# Patient Record
Sex: FEMALE | Race: OTHER
[De-identification: names, ages, dates, MRNs, and addresses within clinical notes are randomized per-mention and may not be internally consistent; named-entity substitution may affect disease eponyms.]

---

## 2018-10-26 ENCOUNTER — HOSPITAL ENCOUNTER (INPATIENT)
Dept: HOSPITAL 36 - ER | Age: 83
LOS: 3 days | Discharge: NURSING, REHAB OR LONG-TERM CARE | DRG: 291 | End: 2018-10-29
Attending: FAMILY MEDICINE | Admitting: FAMILY MEDICINE
Payer: MEDICARE

## 2018-10-26 VITALS — SYSTOLIC BLOOD PRESSURE: 122 MMHG | DIASTOLIC BLOOD PRESSURE: 69 MMHG

## 2018-10-26 DIAGNOSIS — Z16.12: ICD-10-CM

## 2018-10-26 DIAGNOSIS — N39.0: ICD-10-CM

## 2018-10-26 DIAGNOSIS — Z86.73: ICD-10-CM

## 2018-10-26 DIAGNOSIS — Z88.0: ICD-10-CM

## 2018-10-26 DIAGNOSIS — I11.0: Primary | ICD-10-CM

## 2018-10-26 DIAGNOSIS — B96.20: ICD-10-CM

## 2018-10-26 DIAGNOSIS — I48.91: ICD-10-CM

## 2018-10-26 DIAGNOSIS — I50.33: ICD-10-CM

## 2018-10-26 DIAGNOSIS — J18.9: ICD-10-CM

## 2018-10-26 DIAGNOSIS — Z83.3: ICD-10-CM

## 2018-10-26 LAB
ALBUMIN SERPL-MCNC: 3.7 GM/DL (ref 3.7–5.3)
ALBUMIN/GLOB SERPL: 1.2 {RATIO} (ref 1–1.8)
ALP SERPL-CCNC: 90 U/L (ref 34–104)
ALT SERPL-CCNC: 26 U/L (ref 7–52)
AMPHET UR-MCNC: NEGATIVE NG/ML
ANION GAP SERPL CALC-SCNC: 11.7 MMOL/L (ref 7–16)
APPEARANCE UR: (no result)
AST SERPL-CCNC: 24 U/L (ref 13–39)
BACTERIA #/AREA URNS HPF: (no result) /HPF
BARBITURATES UR-MCNC: NEGATIVE UG/ML
BASOPHILS # BLD AUTO: 0.1 TH/CUMM (ref 0–0.2)
BASOPHILS NFR BLD AUTO: 1.7 % (ref 0–2)
BENZODIAZEPINES PNL UR: NEGATIVE
BILIRUB SERPL-MCNC: 0.4 MG/DL (ref 0.3–1)
BILIRUB UR-MCNC: NEGATIVE MG/DL
BUN SERPL-MCNC: 26 MG/DL (ref 7–25)
CALCIUM SERPL-MCNC: 9 MG/DL (ref 8.6–10.3)
CANNABINOIDS SERPL QL CFM: NEGATIVE
CHLORIDE SERPL-SCNC: 100 MEQ/L (ref 98–107)
CO2 SERPL-SCNC: 27.5 MEQ/L (ref 21–31)
COCAINE METAB.OTHER UR-MCNC: NEGATIVE NG/ML
COLOR UR: YELLOW
CREAT SERPL-MCNC: 0.8 MG/DL (ref 0.6–1.2)
EOSINOPHIL # BLD AUTO: 0.1 TH/CMM (ref 0.1–0.4)
EOSINOPHIL NFR BLD AUTO: 2 % (ref 0–5)
EPI CELLS URNS QL MICRO: (no result) /LPF
ERYTHROCYTE [DISTWIDTH] IN BLOOD BY AUTOMATED COUNT: 17 % (ref 11.5–20)
GLOBULIN SER-MCNC: 3.2 GM/DL
GLUCOSE SERPL-MCNC: 103 MG/DL (ref 70–105)
GLUCOSE UR STRIP-MCNC: NEGATIVE MG/DL
HCT VFR BLD CALC: 38.1 % (ref 41–60)
HGB BLD-MCNC: 12.7 GM/DL (ref 12–16)
INR PPP: 1.76 (ref 0.5–1.4)
KETONES UR STRIP-MCNC: NEGATIVE MG/DL
LEUKOCYTE ESTERASE UR-ACNC: NEGATIVE
LYMPHOCYTE AB SER FC-ACNC: 1.6 TH/CMM (ref 1.5–3)
LYMPHOCYTES NFR BLD AUTO: 25.1 % (ref 20–50)
MAGNESIUM SERPL-MCNC: 2.2 MG/DL (ref 1.9–2.7)
MCH RBC QN AUTO: 29.7 PG (ref 27–31)
MCHC RBC AUTO-ENTMCNC: 33.3 PG (ref 28–36)
MCV RBC AUTO: 89.2 FL (ref 81–100)
METHADONE UR CFM-MCNC: NEGATIVE NG/ML
METHAMPHET UR QL: NEGATIVE
MICRO URNS: YES
MONOCYTES # BLD AUTO: 0.5 TH/CMM (ref 0.3–1)
MONOCYTES NFR BLD AUTO: 7.8 % (ref 2–10)
NEUTROPHILS # BLD: 4.2 TH/CMM (ref 1.8–8)
NEUTROPHILS NFR BLD AUTO: 63.4 % (ref 40–80)
NITRITE UR QL STRIP: POSITIVE
OPIATES UR QL: NEGATIVE
PCP UR-MCNC: NEGATIVE UG/L
PH UR STRIP: 7 [PH] (ref 4.6–8)
PHOSPHATE SERPL-MCNC: 3.4 MG/DL (ref 2.5–5)
PLATELET # BLD: 253 TH/CMM (ref 150–400)
PMV BLD AUTO: 7.8 FL
POTASSIUM SERPL-SCNC: 4.2 MEQ/L (ref 3.5–5.1)
PROT UR STRIP-MCNC: NEGATIVE MG/DL
PROTHROMBIN TIME: 17.8 SECONDS (ref 9.5–11.5)
RBC # BLD AUTO: 4.27 MIL/CMM (ref 3.8–5.2)
RBC # UR STRIP: NEGATIVE /UL
RBC #/AREA URNS HPF: (no result) /HPF (ref 0–5)
SODIUM SERPL-SCNC: 135 MEQ/L (ref 136–145)
SP GR UR STRIP: 1.01 (ref 1–1.03)
TRICYCLICS UR QL: NEGATIVE
URINALYSIS COMPLETE PNL UR: (no result)
UROBILINOGEN UR STRIP-ACNC: 0.2 E.U./DL (ref 0.2–1)
WBC # BLD AUTO: 6.5 TH/CMM (ref 4.8–10.8)
WBC #/AREA URNS HPF: (no result) /HPF (ref 0–5)

## 2018-10-26 PROCEDURE — Z7610: HCPCS

## 2018-10-26 NOTE — ED PHYSICIAN CHART
ED Chief Complaint/HPI





- Patient Information


Date Seen:: 10/26/18


Time Seen:: 15:35


Chief Complaint:: poor appetite


History of Present Illness:: 





poor appetite in a patient who has had a previous stroke.





according to the daughter, she occasionally coughs with liquids.





she has had pneumonia in the past.





she is on coumadin at this time.


Allergies:: 


 Allergies











Allergy/AdvReac Type Severity Reaction Status Date / Time


 


Penicillins [PCN] Allergy   Verified 10/26/18 15:35











Vitals:: 


 Vital Signs - 8 hr











  10/26/18





  15:35


 


Temp 98.9 F


 


HR 66


 


RR 16


 


/65


 


O2 Sat % 97











Historian:: Family Member


Review:: Nurse's Note Reviewed





ED Review of Systems





- Review of Systems


General/Constitutional: No fever, No chills, No weight loss, No weakness, No 

diaphoresis, No edema, No loss of appetite, Other


Skin: Skin lesions, No rash, No bruising


Head: No headache, No light-headedness


Eyes: No loss of vision, No pain, No diplopia


ENT: No earache, No nasal drainage, No sore throat, No tinnitus


Neck: No neck pain, No swelling, No thyromegaly, No stiffness, No mass noted


Cardio Vascular: No chest pain, No palpitations, No PND, No orthopnea, No edema


Pulmonary: No SOB, No cough, No sputum, No wheezing


GI: No nausea, No vomiting, No diarrhea, No pain, No melena, No hematochezia, 

No constipation, No hematemesis


G/U: No dysuria, No frequency, No hematuria


Musculoskeletal: No bone or joint pain, No back pain, No muscle pain


Endocrine: No polyuria, No polydipsia


Psychiatric: No prior psych history, No depression, No anxiety, No suicidal 

ideation


Hematopoietic: No bruising, No lymphadenopathy


Allergic/Immuno: No urticaria, No angioedema


Neurological: No syncope, No focal symptoms, No weakness, No paresthesia, No 

headache, No seizure, No dizziness, No confusion, No vertigo





ED Past Medical History





- Past Medical History


Obtainable: No


Past Medical History: HTN, CVA/TIA, Other (atrial fibrillation; pneumonia)





Family Medical History





- Family Member


  ** Daughter


Living Status: Still Living


Hx Family Diabetes: Yes





ED Physical Exam





- Physical Examination


General/Constitutional: Awake, Well-developed, well-nourished, Alert, No 

distress, GCS 15, Non-toxic appearing, Ambulatory


Head: Atraumatic


Eyes: Lids, conjuctiva normal, PERRL, EOMI


Skin: Nl inspection, No rash, No skin lesions, No ecchymosis, Well hydrated, No 

lymphadenopathy


ENMT: External ears, nose nl


Neck: Nontender, No nuchal rigidity


Respiratory: Nl effort/Exclusion


Other Respiratory comments:: 


decreased breath sounds at bases.


Cardio Vascular: RRR, No murmur, gallop, rubs, NL S1 S2


GI: No tenderness/rebounding/guarding, No organomegaly, No hernia, Normal BS's, 

Nondistended


: No CVA tenderness


Other Extremities comments:: 





RUE and RLE with decreased strength and with edema.


Neuro/Psych: Mood normal


Other Neuro/Psych comments:: 





RUE and RLE with decreased strength and with edema.


Other Misc comments:: 





sacral stage I





ED Labs/Radiology/EKG Results





- Lab Results


Results: 


 Laboratory Tests











  10/26/18 10/26/18 10/26/18





  16:30 16:30 16:30


 


WBC  6.5  


 


RBC  4.27  


 


Hgb  12.7  


 


Hct  38.1 L  


 


MCV  89.2  


 


MCH  29.7  


 


MCHC Differential  33.3  


 


RDW  17.0  


 


Plt Count  253  


 


MPV  7.8  


 


Neutrophils %  63.4  


 


Lymphocytes %  25.1  


 


Monocytes %  7.8  


 


Eosinophils %  2.0  


 


Basophils %  1.7  


 


PT   


 


INR   


 


PTT (Actin FS)   


 


Sodium   135 L 


 


Potassium   4.2 


 


Chloride   100 


 


Carbon Dioxide   27.5 


 


Anion Gap   11.7 


 


BUN   26 H 


 


Creatinine   0.8 


 


Est GFR ( Amer)   TNP 


 


Est GFR (Non-Af Amer)   TNP 


 


BUN/Creatinine Ratio   32.5 


 


Glucose   103 


 


Whole Bld Lactic Acid   


 


Calcium   9.0 


 


Phosphorus   3.4 


 


Magnesium   2.2 


 


Total Bilirubin   0.4 


 


AST   24 


 


ALT   26 


 


Alkaline Phosphatase   90 


 


Troponin I    0.01


 


Total Protein   6.9 


 


Albumin   3.7 


 


Globulin   3.2 


 


Albumin/Globulin Ratio   1.2 


 


Urine Source   


 


Urine Color   


 


Urine Clarity   


 


Urine pH   


 


Ur Specific Gravity   


 


Urine Protein   


 


Urine Glucose (UA)   


 


Urine Ketones   


 


Urine Blood   


 


Urine Nitrate   


 


Urine Bilirubin   


 


Urine Urobilinogen   


 


Ur Leukocyte Esterase   


 


Urine RBC   


 


Urine WBC   


 


Ur Epithelial Cells   


 


Urine Bacteria   


 


Urine Opiates Screen   


 


Urine Methadone Screen   


 


Ur Barbiturates Screen   


 


Ur Tricyclics Screen   


 


Ur Phencyclidine Scrn   


 


Amphetamines Screen   


 


U Methamphetamines Scrn   


 


U Benzodiazepines Scrn   


 


U Cocaine Metab Screen   


 


U Cannabinoids Screen   














  10/26/18 10/26/18 10/26/18





  16:30 16:30 16:54


 


WBC   


 


RBC   


 


Hgb   


 


Hct   


 


MCV   


 


MCH   


 


MCHC Differential   


 


RDW   


 


Plt Count   


 


MPV   


 


Neutrophils %   


 


Lymphocytes %   


 


Monocytes %   


 


Eosinophils %   


 


Basophils %   


 


PT   17.8 H 


 


INR   1.76 H 


 


PTT (Actin FS)   32.1 


 


Sodium   


 


Potassium   


 


Chloride   


 


Carbon Dioxide   


 


Anion Gap   


 


BUN   


 


Creatinine   


 


Est GFR ( Amer)   


 


Est GFR (Non-Af Amer)   


 


BUN/Creatinine Ratio   


 


Glucose   


 


Whole Bld Lactic Acid  1.14  


 


Calcium   


 


Phosphorus   


 


Magnesium   


 


Total Bilirubin   


 


AST   


 


ALT   


 


Alkaline Phosphatase   


 


Troponin I   


 


Total Protein   


 


Albumin   


 


Globulin   


 


Albumin/Globulin Ratio   


 


Urine Source    CATH


 


Urine Color    YELLOW


 


Urine Clarity    HAZY


 


Urine pH    7.0


 


Ur Specific Gravity    1.010


 


Urine Protein    NEGATIVE


 


Urine Glucose (UA)    NEGATIVE


 


Urine Ketones    NEGATIVE


 


Urine Blood    NEGATIVE


 


Urine Nitrate    POSITIVE H


 


Urine Bilirubin    NEGATIVE


 


Urine Urobilinogen    0.2


 


Ur Leukocyte Esterase    NEGATIVE


 


Urine RBC    0-2


 


Urine WBC    6-10 H


 


Ur Epithelial Cells    FEW


 


Urine Bacteria    4+ H


 


Urine Opiates Screen   


 


Urine Methadone Screen   


 


Ur Barbiturates Screen   


 


Ur Tricyclics Screen   


 


Ur Phencyclidine Scrn   


 


Amphetamines Screen   


 


U Methamphetamines Scrn   


 


U Benzodiazepines Scrn   


 


U Cocaine Metab Screen   


 


U Cannabinoids Screen   














  10/26/18





  16:54


 


WBC 


 


RBC 


 


Hgb 


 


Hct 


 


MCV 


 


MCH 


 


MCHC Differential 


 


RDW 


 


Plt Count 


 


MPV 


 


Neutrophils % 


 


Lymphocytes % 


 


Monocytes % 


 


Eosinophils % 


 


Basophils % 


 


PT 


 


INR 


 


PTT (Actin FS) 


 


Sodium 


 


Potassium 


 


Chloride 


 


Carbon Dioxide 


 


Anion Gap 


 


BUN 


 


Creatinine 


 


Est GFR ( Amer) 


 


Est GFR (Non-Af Amer) 


 


BUN/Creatinine Ratio 


 


Glucose 


 


Whole Bld Lactic Acid 


 


Calcium 


 


Phosphorus 


 


Magnesium 


 


Total Bilirubin 


 


AST 


 


ALT 


 


Alkaline Phosphatase 


 


Troponin I 


 


Total Protein 


 


Albumin 


 


Globulin 


 


Albumin/Globulin Ratio 


 


Urine Source 


 


Urine Color 


 


Urine Clarity 


 


Urine pH 


 


Ur Specific Gravity 


 


Urine Protein 


 


Urine Glucose (UA) 


 


Urine Ketones 


 


Urine Blood 


 


Urine Nitrate 


 


Urine Bilirubin 


 


Urine Urobilinogen 


 


Ur Leukocyte Esterase 


 


Urine RBC 


 


Urine WBC 


 


Ur Epithelial Cells 


 


Urine Bacteria 


 


Urine Opiates Screen  NEGATIVE


 


Urine Methadone Screen  NEGATIVE


 


Ur Barbiturates Screen  NEGATIVE


 


Ur Tricyclics Screen  NEGATIVE


 


Ur Phencyclidine Scrn  NEGATIVE


 


Amphetamines Screen  NEGATIVE


 


U Methamphetamines Scrn  NEGATIVE


 


U Benzodiazepines Scrn  NEGATIVE


 


U Cocaine Metab Screen  NEGATIVE


 


U Cannabinoids Screen  NEGATIVE














ED Assessment





- Assessment


General Assessment: 





EKG from 16:05:47 p.m.:  atrial fibrillation,  Q wave III and AVF with flipped 

t wave in V1.  





CXR reveals some bronchograms and a possible early RLL infiltrate.





ED Septic Shock





- .


Is Septic Shock (SBP<90, OR Lactate>4 mmol\L) present?: No





- <6hrs of presentation:


Vital Signs: 


 Vital Signs - 8 hr











  10/26/18





  15:35


 


Temp 98.9 F


 


HR 66


 


RR 16


 


/65


 


O2 Sat % 97














ED Reassessment (Disposition)





- Reassessment


Reassessment Condition:: Unchanged





- Diagnosis


Diagnosis:: 


Right lower lobe infiltrate





Urinary tract infection





Hemiplegia s/p prior CVA





Elevated prothrombin time





- Patient Disposition


Discharge/Transfer:: Acute Care w/in this hosp


Accepting Physician:: Dr. Vergara


Time Called:: 17:48


Time Responded:: 17:48


Admitted to:: Telemetry


Condition at Disposition:: Stable, Unchanged

## 2018-10-27 LAB
ALBUMIN SERPL-MCNC: 3.2 GM/DL (ref 3.7–5.3)
ALBUMIN/GLOB SERPL: 1.1 {RATIO} (ref 1–1.8)
ALP SERPL-CCNC: 79 U/L (ref 34–104)
ALT SERPL-CCNC: 21 U/L (ref 7–52)
ANION GAP SERPL CALC-SCNC: 11.5 MMOL/L (ref 7–16)
AST SERPL-CCNC: 20 U/L (ref 13–39)
BASOPHILS # BLD AUTO: 0 TH/CUMM (ref 0–0.2)
BASOPHILS NFR BLD AUTO: 0.2 % (ref 0–2)
BILIRUB SERPL-MCNC: 0.6 MG/DL (ref 0.3–1)
BUN SERPL-MCNC: 18 MG/DL (ref 7–25)
CALCIUM SERPL-MCNC: 8.6 MG/DL (ref 8.6–10.3)
CHLORIDE SERPL-SCNC: 107 MEQ/L (ref 98–107)
CK SERPL-CCNC: 31 U/L (ref 30–223)
CO2 SERPL-SCNC: 25.3 MEQ/L (ref 21–31)
CREAT SERPL-MCNC: 0.7 MG/DL (ref 0.6–1.2)
EOSINOPHIL # BLD AUTO: 0.2 TH/CMM (ref 0.1–0.4)
EOSINOPHIL NFR BLD AUTO: 2.3 % (ref 0–5)
ERYTHROCYTE [DISTWIDTH] IN BLOOD BY AUTOMATED COUNT: 17.2 % (ref 11.5–20)
GLOBULIN SER-MCNC: 2.8 GM/DL
GLUCOSE SERPL-MCNC: 95 MG/DL (ref 70–105)
HCT VFR BLD CALC: 35.8 % (ref 41–60)
HGB BLD-MCNC: 11.9 GM/DL (ref 12–16)
LYMPHOCYTE AB SER FC-ACNC: 1.3 TH/CMM (ref 1.5–3)
LYMPHOCYTES NFR BLD AUTO: 19.2 % (ref 20–50)
MCH RBC QN AUTO: 29.7 PG (ref 27–31)
MCHC RBC AUTO-ENTMCNC: 33.2 PG (ref 28–36)
MCV RBC AUTO: 89.5 FL (ref 81–100)
MONOCYTES # BLD AUTO: 0.5 TH/CMM (ref 0.3–1)
MONOCYTES NFR BLD AUTO: 6.9 % (ref 2–10)
NEUTROPHILS # BLD: 4.8 TH/CMM (ref 1.8–8)
NEUTROPHILS NFR BLD AUTO: 71.4 % (ref 40–80)
PLATELET # BLD: 200 TH/CMM (ref 150–400)
PMV BLD AUTO: 8 FL
POTASSIUM SERPL-SCNC: 3.8 MEQ/L (ref 3.5–5.1)
RBC # BLD AUTO: 4 MIL/CMM (ref 3.8–5.2)
SODIUM SERPL-SCNC: 140 MEQ/L (ref 136–145)
WBC # BLD AUTO: 6.8 TH/CMM (ref 4.8–10.8)

## 2018-10-27 RX ADMIN — POTASSIUM CHLORIDE SCH MEQ: 20 TABLET, EXTENDED RELEASE ORAL at 13:21

## 2018-10-27 RX ADMIN — DILTIAZEM HYDROCHLORIDE SCH MG: 30 TABLET, FILM COATED ORAL at 17:55

## 2018-10-27 NOTE — DIAGNOSTIC IMAGING REPORT
Exam: CT examination of the brain.



HISTORY: CVA.



Total DLP equals 685



CTDI equals 37.4



Findings:



Multiple contiguous thin section of the brain were obtained from the

base of skull to the vertex without the administration of contrast

material, no prior studies available for comparison



The study demonstrates large area hypodensity suggestive of extensive

encephalomalacia of the left parietal lobe.  There is evidence for

chronic ischemic white matter changes bilaterally.

There is no evidence for hemorrhage midline shift or edema.



The bony calvarium is intact.  The paranasal sinuses are well aerated.



IMPRESSION: Extensive encephalomalacia status post wide infarct in

distribution left middle cerebral artery.  Correlation prior exams

recommended.



Atrophy

## 2018-10-27 NOTE — DIAGNOSTIC IMAGING REPORT
Portable chest x-ray

Time: 1642



History: Pneumonia



Allowing for portable technique the heart size is normal. No focal

pulmonary parenchymal processes. No hilar or mediastinal abnormalities. 

Mild congestion is present



Impression: No acute abnormalities. 

Mild congestion.

## 2018-10-27 NOTE — HISTORY & PHYSICAL
ADMIT DATE:  10/27/2018



CHIEF COMPLAINT:  Poor appetite and weakness.



HISTORY OF PRESENT ILLNESS:  This is an 84-year-old female who was admitted to

[facility] with a chief complaint of weakness

and poor appetite.



REVIEW OF SYSTEMS:

GENERAL:  This is an 84-year-old female who appears as stated.

HEAD:  Denies headache, denies dizziness.

EYES:  Denies eye pain.  Denies blurring of vision.

NECK:  Denies neck pain.  Denies nuchal rigidity.

CHEST:  Denies chest pain.  Denies palpitation.

PULMONARY:  Denies shortness of breath. Positive cough.

GASTROINTESTINAL:  Denies abdominal pain.  Denies constipation.  Denies

diarrhea.

MUSCULOSKELETAL:  Denies joint pain.  Denies muscle pain.



SOCIAL HISTORY:  The patient lives in a skilled nursing facility prior to

hospitalization.



PAST SURGICAL HISTORY:  Unremarkable.



FAMILY HISTORY:  Unremarkable.



PAST MEDICAL HISTORY:  Includes hypertension, atrial fibrillation.



PHYSICAL EXAMINATION:

VITAL SIGNS:  Temperature 97.3, heart rate of 86, blood pressure 124/75,

respiration 18, 96% on room air.

HEENT:  Head is atraumatic, normocephalic.  Eyes:  Bilateral conjunctivae are

clear.  Bilateral pupils are equally round and reactive.

NECK:  Supple.  No JVD.

CARDIOVASCULAR:  S1 and S2, without murmur.

PULMONARY:  Clear to auscultation.

GASTROINTESTINAL:  Soft and nontender without guarding.  Positive bowel sounds.

MUSCULOSKELETAL:  No clubbing.  No cyanosis noted.



ASSESSMENT:

1.  Urinary tract infection.

2.  History of cerebrovascular accident.

3.  Hypertension.

4.  Atrial fibrillation.



PLAN: We will admit the patient and will initiate IV antibiotics until we get

the culture results. We are also going to follow up with the ID doctor and

cardiologist and do medication reconciliation accordingly.  Treatment plans were

discussed with the patient's nurse.  Treatment plans were discussed with Dr. Vergara.





DD: 10/27/2018 12:48

DT: 10/27/2018 15:54

JOB# [id number]  [id number]

## 2018-10-28 LAB
ALBUMIN SERPL-MCNC: 3.1 GM/DL (ref 3.7–5.3)
ALBUMIN/GLOB SERPL: 1.1 {RATIO} (ref 1–1.8)
ALP SERPL-CCNC: 76 U/L (ref 34–104)
ALT SERPL-CCNC: 19 U/L (ref 7–52)
ANION GAP SERPL CALC-SCNC: 10.9 MMOL/L (ref 7–16)
AST SERPL-CCNC: 19 U/L (ref 13–39)
BASOPHILS # BLD AUTO: 0 TH/CUMM (ref 0–0.2)
BASOPHILS NFR BLD AUTO: 0.1 % (ref 0–2)
BILIRUB SERPL-MCNC: 0.7 MG/DL (ref 0.3–1)
BUN SERPL-MCNC: 15 MG/DL (ref 7–25)
CALCIUM SERPL-MCNC: 8.5 MG/DL (ref 8.6–10.3)
CHLORIDE SERPL-SCNC: 106 MEQ/L (ref 98–107)
CO2 SERPL-SCNC: 23.8 MEQ/L (ref 21–31)
CREAT SERPL-MCNC: 0.7 MG/DL (ref 0.6–1.2)
EOSINOPHIL # BLD AUTO: 0.2 TH/CMM (ref 0.1–0.4)
EOSINOPHIL NFR BLD AUTO: 2.5 % (ref 0–5)
ERYTHROCYTE [DISTWIDTH] IN BLOOD BY AUTOMATED COUNT: 16.9 % (ref 11.5–20)
GLOBULIN SER-MCNC: 2.8 GM/DL
GLUCOSE SERPL-MCNC: 93 MG/DL (ref 70–105)
HCT VFR BLD CALC: 35.3 % (ref 41–60)
HGB BLD-MCNC: 12 GM/DL (ref 12–16)
INR PPP: 1.76 (ref 0.5–1.4)
LYMPHOCYTE AB SER FC-ACNC: 1.6 TH/CMM (ref 1.5–3)
LYMPHOCYTES NFR BLD AUTO: 26.4 % (ref 20–50)
MCH RBC QN AUTO: 30.3 PG (ref 27–31)
MCHC RBC AUTO-ENTMCNC: 33.8 PG (ref 28–36)
MCV RBC AUTO: 89.7 FL (ref 81–100)
MONOCYTES # BLD AUTO: 0.5 TH/CMM (ref 0.3–1)
MONOCYTES NFR BLD AUTO: 8.2 % (ref 2–10)
NEUTROPHILS # BLD: 3.9 TH/CMM (ref 1.8–8)
NEUTROPHILS NFR BLD AUTO: 62.8 % (ref 40–80)
PLATELET # BLD: 219 TH/CMM (ref 150–400)
PMV BLD AUTO: 7.7 FL
POTASSIUM SERPL-SCNC: 3.7 MEQ/L (ref 3.5–5.1)
PROTHROMBIN TIME: 17.8 SECONDS (ref 9.5–11.5)
RBC # BLD AUTO: 3.94 MIL/CMM (ref 3.8–5.2)
SODIUM SERPL-SCNC: 137 MEQ/L (ref 136–145)
WBC # BLD AUTO: 6.2 TH/CMM (ref 4.8–10.8)

## 2018-10-28 RX ADMIN — DILTIAZEM HYDROCHLORIDE SCH: 30 TABLET, FILM COATED ORAL at 06:02

## 2018-10-28 RX ADMIN — POTASSIUM CHLORIDE SCH MEQ: 750 TABLET, EXTENDED RELEASE ORAL at 08:32

## 2018-10-28 RX ADMIN — DILTIAZEM HYDROCHLORIDE SCH: 30 TABLET, FILM COATED ORAL at 17:17

## 2018-10-28 RX ADMIN — DILTIAZEM HYDROCHLORIDE SCH: 30 TABLET, FILM COATED ORAL at 00:01

## 2018-10-28 RX ADMIN — DILTIAZEM HYDROCHLORIDE SCH: 30 TABLET, FILM COATED ORAL at 12:05

## 2018-10-28 RX ADMIN — POTASSIUM CHLORIDE SCH MEQ: 20 TABLET, EXTENDED RELEASE ORAL at 08:32

## 2018-10-28 NOTE — INTERNAL MEDICINE PROG NOTE
Internal Medicine Subjective





- Subjective


Patient seen and examined:: chart reviewed


Patient is:: awake, verbal


Patient Complaints of:: other (weakness)


Per staff patient has:: poor appetite





Internal Medicine Objective





- Results


Result Diagrams: 


 10/28/18 05:15





 10/28/18 05:15


Recent Labs: 


 Laboratory Last Values











WBC  6.2 Th/cmm (4.8-10.8)   10/28/18  05:15    


 


RBC  3.94 Mil/cmm (3.80-5.20)   10/28/18  05:15    


 


Hgb  12.0 gm/dL (12-16)   10/28/18  05:15    


 


Hct  35.3 % (41.0-60)  L  10/28/18  05:15    


 


MCV  89.7 fl ()   10/28/18  05:15    


 


MCH  30.3 pg (27.0-31.0)   10/28/18  05:15    


 


MCHC Differential  33.8 pg (28.0-36.0)   10/28/18  05:15    


 


RDW  16.9 % (11.5-20.0)   10/28/18  05:15    


 


Plt Count  219 Th/cmm (150-400)   10/28/18  05:15    


 


MPV  7.7 fl  10/28/18  05:15    


 


Neutrophils %  62.8 % (40.0-80.0)   10/28/18  05:15    


 


Lymphocytes %  26.4 % (20.0-50.0)   10/28/18  05:15    


 


Monocytes %  8.2 % (2.0-10.0)   10/28/18  05:15    


 


Eosinophils %  2.5 % (0.0-5.0)   10/28/18  05:15    


 


Basophils %  0.1 % (0.0-2.0)   10/28/18  05:15    


 


PT  17.8 SECONDS (9.5-11.5)  H  10/28/18  05:15    


 


INR  1.76  (0.5-1.4)  H  10/28/18  05:15    


 


PTT (Actin FS)  32.1 SECONDS (26.0-38.0)   10/26/18  16:30    


 


Sodium  137 mEq/L (136-145)   10/28/18  05:15    


 


Potassium  3.7 mEq/L (3.5-5.1)   10/28/18  05:15    


 


Chloride  106 mEq/L ()   10/28/18  05:15    


 


Carbon Dioxide  23.8 mEq/L (21.0-31.0)   10/28/18  05:15    


 


Anion Gap  10.9  (7.0-16.0)   10/28/18  05:15    


 


BUN  15 mg/dL (7-25)   10/28/18  05:15    


 


Creatinine  0.7 mg/dL (0.6-1.2)   10/28/18  05:15    


 


Est GFR ( Amer)  TNP   10/28/18  05:15    


 


Est GFR (Non-Af Amer)  TNP   10/28/18  05:15    


 


BUN/Creatinine Ratio  21.4   10/28/18  05:15    


 


Glucose  93 mg/dL ()   10/28/18  05:15    


 


Whole Bld Lactic Acid  1.14 mmol/L (0.60-1.99)   10/26/18  16:30    


 


Calcium  8.5 mg/dL (8.6-10.3)  L  10/28/18  05:15    


 


Phosphorus  3.4 mg/dL (2.5-5.0)   10/26/18  16:30    


 


Magnesium  2.2 mg/dL (1.9-2.7)   10/26/18  16:30    


 


Total Bilirubin  0.7 mg/dL (0.3-1.0)   10/28/18  05:15    


 


AST  19 U/L (13-39)   10/28/18  05:15    


 


ALT  19 U/L (7-52)   10/28/18  05:15    


 


Alkaline Phosphatase  76 U/L ()   10/28/18  05:15    


 


Creatine Kinase  31 U/L ()   10/27/18  06:00    


 


Troponin I  0.01 ng/mL (0.01-0.05)   10/26/18  16:30    


 


B-Natriuretic Peptide  302.0 pg/mL (5.0-100.0)  H  10/28/18  05:15    


 


Total Protein  5.9 gm/dL (6.0-8.3)  L  10/28/18  05:15    


 


Albumin  3.1 gm/dL (3.7-5.3)  L  10/28/18  05:15    


 


Globulin  2.8 gm/dL  10/28/18  05:15    


 


Albumin/Globulin Ratio  1.1  (1.0-1.8)   10/28/18  05:15    


 


TSH  0.42 uIU/ml (0.34-5.60)   10/26/18  16:30    


 


Urine Source  CATH   10/26/18  16:54    


 


Urine Color  YELLOW   10/26/18  16:54    


 


Urine Clarity  HAZY  (CLEAR)   10/26/18  16:54    


 


Urine pH  7.0  (4.6 - 8.0)   10/26/18  16:54    


 


Ur Specific Gravity  1.010  (1.005-1.030)   10/26/18  16:54    


 


Urine Protein  NEGATIVE mg/dL (NEGATIVE)   10/26/18  16:54    


 


Urine Glucose (UA)  NEGATIVE mg/dL (NEGATIVE)   10/26/18  16:54    


 


Urine Ketones  NEGATIVE mg/dL (NEGATIVE)   10/26/18  16:54    


 


Urine Blood  NEGATIVE  (NEGATIVE)   10/26/18  16:54    


 


Urine Nitrate  POSITIVE  (NEGATIVE)  H  10/26/18  16:54    


 


Urine Bilirubin  NEGATIVE  (NEGATIVE)   10/26/18  16:54    


 


Urine Urobilinogen  0.2 E.U./dL (0.2 - 1.0)   10/26/18  16:54    


 


Ur Leukocyte Esterase  NEGATIVE  (NEGATIVE)   10/26/18  16:54    


 


Urine RBC  0-2 /hpf (0-5)   10/26/18  16:54    


 


Urine WBC  6-10 /hpf (0-5)  H  10/26/18  16:54    


 


Ur Epithelial Cells  FEW /lpf (FEW)   10/26/18  16:54    


 


Urine Bacteria  4+ /hpf (NONE SEEN)  H  10/26/18  16:54    


 


Urine Opiates Screen  NEGATIVE  (NEGATIVE)   10/26/18  16:54    


 


Urine Methadone Screen  NEGATIVE  (NEGATIVE)   10/26/18  16:54    


 


Ur Barbiturates Screen  NEGATIVE  (NEGATIVE)   10/26/18  16:54    


 


Ur Tricyclics Screen  NEGATIVE  (NEGATIVE)   10/26/18  16:54    


 


Ur Phencyclidine Scrn  NEGATIVE  (NEGATIVE)   10/26/18  16:54    


 


Amphetamines Screen  NEGATIVE  (NEGATIVE)   10/26/18  16:54    


 


U Methamphetamines Scrn  NEGATIVE  (NEGATIVE)   10/26/18  16:54    


 


U Benzodiazepines Scrn  NEGATIVE  (NEGATIVE)   10/26/18  16:54    


 


U Cocaine Metab Screen  NEGATIVE  (NEGATIVE)   10/26/18  16:54    


 


U Cannabinoids Screen  NEGATIVE  (NEGATIVE)   10/26/18  16:54    














- Physical Exam


Vitals and I&O: 


 Vital Signs











Temp  97.3 F   10/28/18 07:50


 


Pulse  91   10/28/18 07:50


 


Resp  18   10/28/18 07:50


 


BP  124/67   10/28/18 08:32


 


Pulse Ox  99   10/28/18 07:50








 Intake & Output











 10/27/18 10/28/18 10/28/18





 18:59 06:59 18:59


 


Intake Total  1000 


 


Balance  1000 


 


Weight (lbs) 60.781 kg 61.19 kg 


 


Intake:   


 


  Intake, IV Amount  1000 


 


    Sodium Chloride 0.45% 1,  1000 





    000 ml @ 50 mls/hr IV .   





    Q20H [facility] Rx#:[id number]   


 


Other:   


 


  # Voids 4 3 


 


  # Bowel Movements 0 0 


 


  Weight Source Bedscale Bedscale 











Active Medications: 


Current Medications





Diltiazem HCl (Cardizem)  60 mg PO Q6HR MARK


   Stop: 12/26/18 17:59


   Last Admin: 10/28/18 06:02 Dose:  Not Given


Furosemide (Lasix)  20 mg IVP DAILY MARK


   Stop: 12/27/18 08:59


   Last Admin: 10/28/18 08:32 Dose:  20 mg


Furosemide (Lasix)  20 mg PO DAILY MARK


   Stop: 12/26/18 12:59


   Last Admin: 10/28/18 08:32 Dose:  20 mg


Sodium Chloride (Nacl 0.45%)  1,000 mls @ 50 mls/hr IV .Q20H MARK


   Stop: 12/25/18 21:19


   Last Admin: 10/28/18 04:40 Dose:  50 mls/hr


Levofloxacin (Levaquin Pb)  500 mg in 100 mls @ 100 mls/hr IV Q24HR MARK


   Stop: 12/26/18 12:59


   Last Admin: 10/27/18 13:19 Dose:  100 mls/hr


Ipratropium Bromide (Atrovent Neb 0.5mg/2.5ml)  0.5 mg HHN Q4HR PRN


   PRN Reason: Shortness of Breath


   Stop: 12/26/18 12:47


Potassium Chloride (Klor-Con)  10 meq PO DAILY MARK


   Stop: 12/27/18 08:59


   Last Admin: 10/28/18 08:32 Dose:  10 meq


Potassium Chloride (Klor-Con)  20 meq PO DAILY MARK


   Stop: 12/26/18 12:59


   Last Admin: 10/28/18 08:32 Dose:  20 meq


Warfarin Sodium (Coumadin)  1.5 mg PO SuTuThSa@1300 MARK; Protocol


   Stop: 12/26/18 12:59


   Last Admin: 10/27/18 13:21 Dose:  1.5 mg


Warfarin Sodium (Coumadin)  2 mg PO MWF@1300 MARK; Protocol


   Stop: 12/28/18 12:59








General: alert


HEENT: NC/AT


Neck: Supple


Lungs: CTAB


Cardiovascular: Normal S1, Normal S2


Abdomen: soft, non-tender


Extremities: clear


Neurological: no change





Internal Medicine Assmt/Plan





- Assessment


Assessment: 





uti


h/o cva


htn


atrial fib 





- Plan


Plan: 





as per order sheet

## 2018-10-28 NOTE — CONSULTATION
DATE OF CONSULTATION:  10/27/2018



The patient of Dr. Vergara.



HISTORY AND PHYSICAL:  This 84-year-old female patient who was admitted to

[facility] complaining of weakness, tiredness and short of

breath.  The patient had atrial fibrillation with elevated BNP level and the

patient is admitted and cardiac consult requested.



PAST MEDICAL HISTORY:  The patient has a history of atrial fibrillation, CVA

with late effect and hypertension.



FAMILY HISTORY:  Unremarkable.



SOCIAL HISTORY:  No history of smoking, alcohol abuse.



ALLERGIES:  No known allergies.



PHYSICAL EXAMINATION:

VITAL SIGNS:  Blood pressure 105/48, pulse 91 and irregular and respirations 20.

HEAD:  Normocephalic.  No lumps or bumps.

EYES:  Pupils equal and reactive to light.  Fundi show AV nicking, sclerae

white, conjunctivae pink.

NECK:  Carotid 2+.  Normal upstroke.  JVD 10 cm above sternal angle.  Thyroid

not palpable.  Lymph nodes not palpable.

CHEST:  Shows increased AP diameter.  No kyphosis or scoliosis.

LUNGS:  Bilateral rales.  Decreased breath sounds both the bases.

HEART:  PMI, fifth intercostal space with lateral to midclavicular line.  S1

irregular.  S2, S3, S4, soft systolic murmur.

ABDOMEN:  Soft.  Liver and spleen not palpable.  No organomegaly.  Bowel sounds

active.

NEUROLOGIC:  No focal neurological deficit.

EXTREMITIES:  Peripheral pulses 2+.  No pedal edema.



CLINICAL IMPRESSION:  Congestive heart failure, diastolic dysfunction,

acute-on-chronic cerebrovascular accident with late effect, urinary tract

infection, hypertension and atrial fibrillation.



PLAN:  Continue present care and antibiotics, fluid restriction and IV Lasix.





DD: 10/28/2018 15:06

DT: 10/28/2018 17:42

JOB# [id number]  [id number]

## 2018-10-28 NOTE — CARDIOLOGY
10/27/2018



Patient of Dr. Vergara.



M-MODE ECHOCARDIOGRAM:  Mitral valve, anterior leaflet of mitral valve shows

normal excursion, EF velocity.  Posterior leaflet of the mitral valve shows

normal excursion.  Left ventricular posterior wall showed normal thickness,

excursion.  Interventricular septum showed normal thickness, excursion. 

Ejection fraction 65%.  Left atrium normal.  Aortic root shows normal dimension,

normal excursion of aortic leaflets.



CONCLUSION:  Normal M-Mode echo, ejection fraction 65%.



2D ECHO:  Long axis view showed normal sized left ventricle with normal wall

motion, mitral valve shows normal excursion.  Left atrium normal.  Aortic root

shows normal dimension, normal excursion of aortic leaflets.  Short axis view of

mitral valve normal.  Short axis view of aortic valve normal.  Apical

four-chamber view showed normal sized left ventricle, left atrium, right

ventricle and right atrium enlarged.



CONCLUSION:  Right atrial enlargement, ejection fraction 65%.



Doppler study shows moderate mitral regurgitation, moderate-to-severe tricuspid

regurgitation, right ventricular systolic pressure 48 mmHg with mild pulmonary

hypertension.





DD: 10/28/2018 15:39

DT: 10/28/2018 17:31

[facility]# [id number]  [id number]

## 2018-10-29 RX ADMIN — POTASSIUM CHLORIDE SCH MEQ: 20 TABLET, EXTENDED RELEASE ORAL at 08:20

## 2018-10-29 RX ADMIN — DILTIAZEM HYDROCHLORIDE SCH: 30 TABLET, FILM COATED ORAL at 00:15

## 2018-10-29 RX ADMIN — DILTIAZEM HYDROCHLORIDE SCH MG: 30 TABLET, FILM COATED ORAL at 14:03

## 2018-10-29 RX ADMIN — DILTIAZEM HYDROCHLORIDE SCH: 30 TABLET, FILM COATED ORAL at 05:13

## 2018-10-29 RX ADMIN — POTASSIUM CHLORIDE SCH MEQ: 750 TABLET, EXTENDED RELEASE ORAL at 08:20

## 2018-10-29 NOTE — INTERNAL MEDICINE PROG NOTE
Internal Medicine Subjective





- Subjective


Patient seen and examined:: chart reviewed, other (doing well )


Patient is:: awake, verbal


Patient Complaints of:: other (weakness)


Per staff patient has:: poor appetite





Internal Medicine Objective





- Results


Result Diagrams: 


 10/28/18 05:15





 10/28/18 05:15


Recent Labs: 


 Laboratory Last Values











WBC  6.2 Th/cmm (4.8-10.8)   10/28/18  05:15    


 


RBC  3.94 Mil/cmm (3.80-5.20)   10/28/18  05:15    


 


Hgb  12.0 gm/dL (12-16)   10/28/18  05:15    


 


Hct  35.3 % (41.0-60)  L  10/28/18  05:15    


 


MCV  89.7 fl ()   10/28/18  05:15    


 


MCH  30.3 pg (27.0-31.0)   10/28/18  05:15    


 


MCHC Differential  33.8 pg (28.0-36.0)   10/28/18  05:15    


 


RDW  16.9 % (11.5-20.0)   10/28/18  05:15    


 


Plt Count  219 Th/cmm (150-400)   10/28/18  05:15    


 


MPV  7.7 fl  10/28/18  05:15    


 


Neutrophils %  62.8 % (40.0-80.0)   10/28/18  05:15    


 


Lymphocytes %  26.4 % (20.0-50.0)   10/28/18  05:15    


 


Monocytes %  8.2 % (2.0-10.0)   10/28/18  05:15    


 


Eosinophils %  2.5 % (0.0-5.0)   10/28/18  05:15    


 


Basophils %  0.1 % (0.0-2.0)   10/28/18  05:15    


 


PT  17.8 SECONDS (9.5-11.5)  H  10/28/18  05:15    


 


INR  1.76  (0.5-1.4)  H  10/28/18  05:15    


 


PTT (Actin FS)  32.1 SECONDS (26.0-38.0)   10/26/18  16:30    


 


Sodium  137 mEq/L (136-145)   10/28/18  05:15    


 


Potassium  3.7 mEq/L (3.5-5.1)   10/28/18  05:15    


 


Chloride  106 mEq/L ()   10/28/18  05:15    


 


Carbon Dioxide  23.8 mEq/L (21.0-31.0)   10/28/18  05:15    


 


Anion Gap  10.9  (7.0-16.0)   10/28/18  05:15    


 


BUN  15 mg/dL (7-25)   10/28/18  05:15    


 


Creatinine  0.7 mg/dL (0.6-1.2)   10/28/18  05:15    


 


Est GFR ( Amer)  TNP   10/28/18  05:15    


 


Est GFR (Non-Af Amer)  TNP   10/28/18  05:15    


 


BUN/Creatinine Ratio  21.4   10/28/18  05:15    


 


Glucose  93 mg/dL ()   10/28/18  05:15    


 


Whole Bld Lactic Acid  1.14 mmol/L (0.60-1.99)   10/26/18  16:30    


 


Calcium  8.5 mg/dL (8.6-10.3)  L  10/28/18  05:15    


 


Phosphorus  3.4 mg/dL (2.5-5.0)   10/26/18  16:30    


 


Magnesium  2.2 mg/dL (1.9-2.7)   10/26/18  16:30    


 


Total Bilirubin  0.7 mg/dL (0.3-1.0)   10/28/18  05:15    


 


AST  19 U/L (13-39)   10/28/18  05:15    


 


ALT  19 U/L (7-52)   10/28/18  05:15    


 


Alkaline Phosphatase  76 U/L ()   10/28/18  05:15    


 


Creatine Kinase  31 U/L ()   10/27/18  06:00    


 


Troponin I  0.01 ng/mL (0.01-0.05)   10/26/18  16:30    


 


B-Natriuretic Peptide  302.0 pg/mL (5.0-100.0)  H  10/28/18  05:15    


 


Total Protein  5.9 gm/dL (6.0-8.3)  L  10/28/18  05:15    


 


Albumin  3.1 gm/dL (3.7-5.3)  L  10/28/18  05:15    


 


Globulin  2.8 gm/dL  10/28/18  05:15    


 


Albumin/Globulin Ratio  1.1  (1.0-1.8)   10/28/18  05:15    


 


TSH  0.42 uIU/ml (0.34-5.60)   10/26/18  16:30    


 


Urine Source  CATH   10/26/18  16:54    


 


Urine Color  YELLOW   10/26/18  16:54    


 


Urine Clarity  HAZY  (CLEAR)   10/26/18  16:54    


 


Urine pH  7.0  (4.6 - 8.0)   10/26/18  16:54    


 


Ur Specific Gravity  1.010  (1.005-1.030)   10/26/18  16:54    


 


Urine Protein  NEGATIVE mg/dL (NEGATIVE)   10/26/18  16:54    


 


Urine Glucose (UA)  NEGATIVE mg/dL (NEGATIVE)   10/26/18  16:54    


 


Urine Ketones  NEGATIVE mg/dL (NEGATIVE)   10/26/18  16:54    


 


Urine Blood  NEGATIVE  (NEGATIVE)   10/26/18  16:54    


 


Urine Nitrate  POSITIVE  (NEGATIVE)  H  10/26/18  16:54    


 


Urine Bilirubin  NEGATIVE  (NEGATIVE)   10/26/18  16:54    


 


Urine Urobilinogen  0.2 E.U./dL (0.2 - 1.0)   10/26/18  16:54    


 


Ur Leukocyte Esterase  NEGATIVE  (NEGATIVE)   10/26/18  16:54    


 


Urine RBC  0-2 /hpf (0-5)   10/26/18  16:54    


 


Urine WBC  6-10 /hpf (0-5)  H  10/26/18  16:54    


 


Ur Epithelial Cells  FEW /lpf (FEW)   10/26/18  16:54    


 


Urine Bacteria  4+ /hpf (NONE SEEN)  H  10/26/18  16:54    


 


Urine Opiates Screen  NEGATIVE  (NEGATIVE)   10/26/18  16:54    


 


Urine Methadone Screen  NEGATIVE  (NEGATIVE)   10/26/18  16:54    


 


Ur Barbiturates Screen  NEGATIVE  (NEGATIVE)   10/26/18  16:54    


 


Ur Tricyclics Screen  NEGATIVE  (NEGATIVE)   10/26/18  16:54    


 


Ur Phencyclidine Scrn  NEGATIVE  (NEGATIVE)   10/26/18  16:54    


 


Amphetamines Screen  NEGATIVE  (NEGATIVE)   10/26/18  16:54    


 


U Methamphetamines Scrn  NEGATIVE  (NEGATIVE)   10/26/18  16:54    


 


U Benzodiazepines Scrn  NEGATIVE  (NEGATIVE)   10/26/18  16:54    


 


U Cocaine Metab Screen  NEGATIVE  (NEGATIVE)   10/26/18  16:54    


 


U Cannabinoids Screen  NEGATIVE  (NEGATIVE)   10/26/18  16:54    














- Physical Exam


Vitals and I&O: 


 Vital Signs











Temp  97.4 F   10/29/18 13:30


 


Pulse  77   10/29/18 14:03


 


Resp  17   10/29/18 13:30


 


BP  120/71   10/29/18 13:30


 


Pulse Ox  100   10/29/18 13:30








 Intake & Output











 10/28/18 10/29/18 10/29/18





 18:59 06:59 18:59


 


Intake Total 1200 994.167 


 


Balance 1200 994.167 


 


Weight (lbs) 61.19 kg 61.689 kg 60.963 kg


 


Intake:   


 


  Intake, IV Amount 100 994.167 


 


    Meropenem 500 mg In 100  





    Sodium Chloride 0.9% 100   





    ml @ 100 mls/hr IV Q8H   





    [facility] Rx#:[id number]   


 


    Sodium Chloride 0.45% 1,  994.167 





    000 ml @ 50 mls/hr IV .   





    Q20H [facility] Rx#:[id number]   


 


  Oral 1100  


 


Other:   


 


  # Voids 3  2


 


  # Bowel Movements 0  


 


  Weight Source Bedscale Bedscale Bedscale











Active Medications: 


Current Medications





Diltiazem HCl (Cardizem)  60 mg PO Q6HR MARK


   Stop: 12/26/18 17:59


   Last Admin: 10/29/18 14:03 Dose:  60 mg


Furosemide (Lasix)  20 mg IVP DAILY MARK


   Stop: 12/27/18 08:59


   Last Admin: 10/29/18 08:20 Dose:  20 mg


Sodium Chloride (Nacl 0.45%)  1,000 mls @ 50 mls/hr IV .Q20H MARK


   Stop: 12/25/18 21:19


   Last Admin: 10/29/18 00:33 Dose:  50 mls/hr


Meropenem 500 mg/ Sodium (Chloride)  100 mls @ 100 mls/hr IV Q12HR MARK


   Stop: 12/28/18 08:59


   Last Admin: 10/29/18 08:22 Dose:  100 mls/hr


Ipratropium Bromide (Atrovent Neb 0.5mg/2.5ml)  0.5 mg HHN Q4HR PRN


   PRN Reason: Shortness of Breath


   Stop: 12/26/18 12:47


Potassium Chloride (Klor-Con)  10 meq PO DAILY MARK


   Stop: 12/27/18 08:59


   Last Admin: 10/29/18 08:20 Dose:  10 meq


Potassium Chloride (Klor-Con)  20 meq PO DAILY MARK


   Stop: 12/26/18 12:59


   Last Admin: 10/29/18 08:20 Dose:  20 meq


Warfarin Sodium (Coumadin)  1.5 mg PO SuTuThSa@1300 MARK; Protocol


   Stop: 12/26/18 12:59


   Last Admin: 10/28/18 12:34 Dose:  1.5 mg


Warfarin Sodium (Coumadin)  2 mg PO MWF@1300 MARK; Protocol


   Stop: 12/28/18 12:59


   Last Admin: 10/29/18 14:02 Dose:  2 mg








General: alert


HEENT: NC/AT


Neck: Supple


Lungs: CTAB


Cardiovascular: Normal S1, Normal S2


Abdomen: soft, non-tender


Extremities: clear


Neurological: no change





Internal Medicine Assmt/Plan





- Assessment


Assessment: 





uti


h/o cva


htn


atrial fib 





- Plan


Plan: 





as per order sheet

## 2018-10-29 NOTE — CONSULTATION
DATE OF CONSULTATION:     10/28/2018



INFECTIOUS DISEASE CONSULTATION



REFERRING PHYSICIAN:  Dr. Vergara.



REASON FOR CONSULTATION:  UTI.



HISTORY OF PRESENT ILLNESS:  The patient is a 54-year-old female with a past

medical history of hypertension and atrial fibrillation, admitted to [facility] ER for generalized weakness and poor appetite.  On initial

evaluation, the patient's temperature was 98.9 degrees Fahrenheit and WBC count

was 6500.  Urinalysis showed hazy urine with wbc's 6-10 and 4+ bacteria.  The

patient was diagnosed to have UTI and urine culture grew ESBL Escherichia coli. 

ID consult was called for further antibiotic management.  The patient was

receiving Levaquin, which was changed to meropenem.



PAST MEDICAL HISTORY:  Hypertension, atrial fibrillation.



SOCIAL HISTORY:  The patient lives at nursing facility.  No history of smoking,

alcohol or drug use.



PAST SURGICAL HISTORY:  Noncontributory.



FAMILY HISTORY:  Unremarkable.



REVIEW OF SYSTEMS:

GENERAL:  The patient has no fever, no chills.

HEENT:  No diplopia, no photophobia, no sore throat.

RESPIRATORY:  No cough, no shortness of breath.

CARDIOVASCULAR:  No chest pain or palpitation.

GASTROINTESTINAL:  No nausea, no vomiting, no diarrhea, no constipation.

GENITOURINARY:  No dysuria.  No headache.

GENITOURINARY:  No dysuria, no hematuria.

CENTRAL NERVOUS SYSTEM:  No headache, no dizziness, no focal weakness.

SKIN:  The patient has no rash, no ulcer.



PHYSICAL EXAMINATION:

VITAL SIGNS:  Shows temperature is 98 degrees Fahrenheit, pulse 95, respiration

is 19, blood pressure 99/64.

GENERAL:  The patient is comfortable lying in the bed, not in acute distress.

HEENT:  Head is normocephalic, atraumatic.  Oral cavity moist, pink tongue. 

Eyes:  PERRLA, no pallor or icterus.  PERRLA, EOMI.

NECK:  Supple, no JVD, no carotid bruit.  Trachea in midline.

CHEST:  Bilateral breath sounds.  No crackles or wheezing.

HEART:  S1, S2 within normal limits.  Regular rhythm.  No murmur, no gallop.

ABDOMEN:  Soft, nontender, nondistended.  Bowel sounds present.

EXTREMITIES:  No cyanosis, no clubbing, no edema.

NEUROLOGIC:  Alert, awake, oriented x 3.



LABORATORY DATA:  Current lab shows WBC count is 6200, hemoglobin 12, hematocrit

35.3, platelets are 290,000, neutrophil is 63%.  Sodium is 137, potassium 3.7,

chloride 106, bicarbonate is 24, BUN is 15, creatinine 0.7, glucose is 93. 

Urinalysis shows positive nitrite, wbc's 6-10 and bacteria 4+.  Urine culture

grew ESBL Escherichia coli.  Blood culture 1 set is negative and MRSA screen is

negative.  Chest x-ray showed no acute abnormality, mild congestion.  CT scan of

the head showed an extensive encephalomalacia, status post wide infarct in the

distribution of the left middle cerebral artery.  Atrophy.



IMPRESSION:

1.  Urinary tract infection.  Urine culture grew ESBL Escherichia coli.

2.  History of cerebrovascular accident.

3.  Hypertension.

4.  Atrial fibrillation.



RECOMMENDATIONS:

Continue meropenem for a total of 7 days.







DD: 10/29/2018 00:21

DT: 10/29/2018 04:23

JOB# [id number]  [id number]

GABBY

## 2018-10-29 NOTE — INFECTIOUS DISEASE PROG NOTE
Infectious Disease Subjective





- Review of Systems


Service Date: 10/29/18


Subjective: 





Doing well. no fever.





Infectious Disease Objective





- Results


Result Diagrams: 


 10/28/18 05:15





 10/28/18 05:15


Recent Labs: 


 Laboratory Last Values











WBC  6.2 Th/cmm (4.8-10.8)   10/28/18  05:15    


 


RBC  3.94 Mil/cmm (3.80-5.20)   10/28/18  05:15    


 


Hgb  12.0 gm/dL (12-16)   10/28/18  05:15    


 


Hct  35.3 % (41.0-60)  L  10/28/18  05:15    


 


MCV  89.7 fl ()   10/28/18  05:15    


 


MCH  30.3 pg (27.0-31.0)   10/28/18  05:15    


 


MCHC Differential  33.8 pg (28.0-36.0)   10/28/18  05:15    


 


RDW  16.9 % (11.5-20.0)   10/28/18  05:15    


 


Plt Count  219 Th/cmm (150-400)   10/28/18  05:15    


 


MPV  7.7 fl  10/28/18  05:15    


 


Neutrophils %  62.8 % (40.0-80.0)   10/28/18  05:15    


 


Lymphocytes %  26.4 % (20.0-50.0)   10/28/18  05:15    


 


Monocytes %  8.2 % (2.0-10.0)   10/28/18  05:15    


 


Eosinophils %  2.5 % (0.0-5.0)   10/28/18  05:15    


 


Basophils %  0.1 % (0.0-2.0)   10/28/18  05:15    


 


PT  17.8 SECONDS (9.5-11.5)  H  10/28/18  05:15    


 


INR  1.76  (0.5-1.4)  H  10/28/18  05:15    


 


PTT (Actin FS)  32.1 SECONDS (26.0-38.0)   10/26/18  16:30    


 


Sodium  137 mEq/L (136-145)   10/28/18  05:15    


 


Potassium  3.7 mEq/L (3.5-5.1)   10/28/18  05:15    


 


Chloride  106 mEq/L ()   10/28/18  05:15    


 


Carbon Dioxide  23.8 mEq/L (21.0-31.0)   10/28/18  05:15    


 


Anion Gap  10.9  (7.0-16.0)   10/28/18  05:15    


 


BUN  15 mg/dL (7-25)   10/28/18  05:15    


 


Creatinine  0.7 mg/dL (0.6-1.2)   10/28/18  05:15    


 


Est GFR ( Amer)  TNP   10/28/18  05:15    


 


Est GFR (Non-Af Amer)  TNP   10/28/18  05:15    


 


BUN/Creatinine Ratio  21.4   10/28/18  05:15    


 


Glucose  93 mg/dL ()   10/28/18  05:15    


 


Whole Bld Lactic Acid  1.14 mmol/L (0.60-1.99)   10/26/18  16:30    


 


Calcium  8.5 mg/dL (8.6-10.3)  L  10/28/18  05:15    


 


Phosphorus  3.4 mg/dL (2.5-5.0)   10/26/18  16:30    


 


Magnesium  2.2 mg/dL (1.9-2.7)   10/26/18  16:30    


 


Total Bilirubin  0.7 mg/dL (0.3-1.0)   10/28/18  05:15    


 


AST  19 U/L (13-39)   10/28/18  05:15    


 


ALT  19 U/L (7-52)   10/28/18  05:15    


 


Alkaline Phosphatase  76 U/L ()   10/28/18  05:15    


 


Creatine Kinase  31 U/L ()   10/27/18  06:00    


 


Troponin I  0.01 ng/mL (0.01-0.05)   10/26/18  16:30    


 


B-Natriuretic Peptide  302.0 pg/mL (5.0-100.0)  H  10/28/18  05:15    


 


Total Protein  5.9 gm/dL (6.0-8.3)  L  10/28/18  05:15    


 


Albumin  3.1 gm/dL (3.7-5.3)  L  10/28/18  05:15    


 


Globulin  2.8 gm/dL  10/28/18  05:15    


 


Albumin/Globulin Ratio  1.1  (1.0-1.8)   10/28/18  05:15    


 


TSH  0.42 uIU/ml (0.34-5.60)   10/26/18  16:30    


 


Urine Source  CATH   10/26/18  16:54    


 


Urine Color  YELLOW   10/26/18  16:54    


 


Urine Clarity  HAZY  (CLEAR)   10/26/18  16:54    


 


Urine pH  7.0  (4.6 - 8.0)   10/26/18  16:54    


 


Ur Specific Gravity  1.010  (1.005-1.030)   10/26/18  16:54    


 


Urine Protein  NEGATIVE mg/dL (NEGATIVE)   10/26/18  16:54    


 


Urine Glucose (UA)  NEGATIVE mg/dL (NEGATIVE)   10/26/18  16:54    


 


Urine Ketones  NEGATIVE mg/dL (NEGATIVE)   10/26/18  16:54    


 


Urine Blood  NEGATIVE  (NEGATIVE)   10/26/18  16:54    


 


Urine Nitrate  POSITIVE  (NEGATIVE)  H  10/26/18  16:54    


 


Urine Bilirubin  NEGATIVE  (NEGATIVE)   10/26/18  16:54    


 


Urine Urobilinogen  0.2 E.U./dL (0.2 - 1.0)   10/26/18  16:54    


 


Ur Leukocyte Esterase  NEGATIVE  (NEGATIVE)   10/26/18  16:54    


 


Urine RBC  0-2 /hpf (0-5)   10/26/18  16:54    


 


Urine WBC  6-10 /hpf (0-5)  H  10/26/18  16:54    


 


Ur Epithelial Cells  FEW /lpf (FEW)   10/26/18  16:54    


 


Urine Bacteria  4+ /hpf (NONE SEEN)  H  10/26/18  16:54    


 


Urine Opiates Screen  NEGATIVE  (NEGATIVE)   10/26/18  16:54    


 


Urine Methadone Screen  NEGATIVE  (NEGATIVE)   10/26/18  16:54    


 


Ur Barbiturates Screen  NEGATIVE  (NEGATIVE)   10/26/18  16:54    


 


Ur Tricyclics Screen  NEGATIVE  (NEGATIVE)   10/26/18  16:54    


 


Ur Phencyclidine Scrn  NEGATIVE  (NEGATIVE)   10/26/18  16:54    


 


Amphetamines Screen  NEGATIVE  (NEGATIVE)   10/26/18  16:54    


 


U Methamphetamines Scrn  NEGATIVE  (NEGATIVE)   10/26/18  16:54    


 


U Benzodiazepines Scrn  NEGATIVE  (NEGATIVE)   10/26/18  16:54    


 


U Cocaine Metab Screen  NEGATIVE  (NEGATIVE)   10/26/18  16:54    


 


U Cannabinoids Screen  NEGATIVE  (NEGATIVE)   10/26/18  16:54    














- Physical Exam


Vitals and I&O: 


 Vital Signs











Temp  97.4 F   10/29/18 13:30


 


Pulse  77   10/29/18 14:03


 


Resp  17   10/29/18 13:30


 


BP  120/71   10/29/18 13:30


 


Pulse Ox  100   10/29/18 13:30








 Intake & Output











 10/28/18 10/29/18 10/29/18





 18:59 06:59 18:59


 


Intake Total 1200 994.167 


 


Balance 1200 994.167 


 


Weight (lbs) 61.19 kg 61.689 kg 60.963 kg


 


Intake:   


 


  Intake, IV Amount 100 994.167 


 


    Meropenem 500 mg In 100  





    Sodium Chloride 0.9% 100   





    ml @ 100 mls/hr IV Q8H   





    [facility] Rx#:[id number]   


 


    Sodium Chloride 0.45% 1,  994.167 





    000 ml @ 50 mls/hr IV .   





    Q20H [facility] Rx#:[id number]   


 


  Oral 1100  


 


Other:   


 


  # Voids 3  2


 


  # Bowel Movements 0  


 


  Weight Source Bedscale Bedscale Bedscale











Active Medications: 


Current Medications





Diltiazem HCl (Cardizem)  60 mg PO Q6HR MARK


   Stop: 12/26/18 17:59


   Last Admin: 10/29/18 14:03 Dose:  60 mg


Furosemide (Lasix)  20 mg IVP DAILY MARK


   Stop: 12/27/18 08:59


   Last Admin: 10/29/18 08:20 Dose:  20 mg


Sodium Chloride (Nacl 0.45%)  1,000 mls @ 50 mls/hr IV .Q20H MARK


   Stop: 12/25/18 21:19


   Last Admin: 10/29/18 00:33 Dose:  50 mls/hr


Meropenem 500 mg/ Sodium (Chloride)  100 mls @ 100 mls/hr IV Q12HR MARK


   Stop: 12/28/18 08:59


   Last Admin: 10/29/18 08:22 Dose:  100 mls/hr


Ipratropium Bromide (Atrovent Neb 0.5mg/2.5ml)  0.5 mg HHN Q4HR PRN


   PRN Reason: Shortness of Breath


   Stop: 12/26/18 12:47


Potassium Chloride (Klor-Con)  10 meq PO DAILY MARK


   Stop: 12/27/18 08:59


   Last Admin: 10/29/18 08:20 Dose:  10 meq


Potassium Chloride (Klor-Con)  20 meq PO DAILY MARK


   Stop: 12/26/18 12:59


   Last Admin: 10/29/18 08:20 Dose:  20 meq


Warfarin Sodium (Coumadin)  1.5 mg PO SuTuThSa@1300 MARK; Protocol


   Stop: 12/26/18 12:59


   Last Admin: 10/28/18 12:34 Dose:  1.5 mg


Warfarin Sodium (Coumadin)  2 mg PO MWF@1300 MARK; Protocol


   Stop: 12/28/18 12:59


   Last Admin: 10/29/18 14:02 Dose:  2 mg








General: no acute distress, well developed, well nourished


HEENT: atraumatic, normocephalic, PERRLA, EOMI, moist mucous membrane


Neck: supple, no thyromegaly, no lymphadenopathy


Cardiovascular: S1S2, regular


Lungs: clear to auscultation bilaterally, clear to percussion


Abdomen: soft, no tender, no distended, no mass


Extremities: no cyanosis, no clubbing, no edema


Neurological: awake, alert, oriented


Skin: intact





Infectious Disease Assmt/Plan





- Assessment


Assessment: 





1. UTI - ESBL E coli. 


2. A fib.


3. HTN.





- Plan


Plan: 





Continue meropenem for total 7 days.

## 2018-11-04 NOTE — DISCHARGE SUMMARY
DATE OF DISCHARGE:  10/29/2018



HOSPITAL COURSE:  This patient was admitted to [facility] on

10/26/2018, was discharged back to [facility] on 10/29/2018.  Apparently,

this patient is an 84-year-old patient, presented with increasing weakness, poor

appetite, found to have urinary tract infection.  Diagnoses are urinary tract

infection, history of cerebrovascular accident, history of hypertension, atrial

fibrillation.  The patient was treated with IV antibiotics.  Dr. Simmons,

Pulmonary consult as well as a consult from Cardiology was seen.  The patient

was in stable condition.  On 10/29/2018, patient was discharged back to [facility] with IV antibiotics where I will be following the patient.



CONDITION AT THE TIME OF DISCHARGE:  Stable.





DD: 11/04/2018 13:14

DT: 11/04/2018 21:41

[facility]# [id number]  [id number]